# Patient Record
Sex: MALE | Race: BLACK OR AFRICAN AMERICAN | Employment: UNEMPLOYED | URBAN - METROPOLITAN AREA
[De-identification: names, ages, dates, MRNs, and addresses within clinical notes are randomized per-mention and may not be internally consistent; named-entity substitution may affect disease eponyms.]

---

## 2020-10-22 ENCOUNTER — HOSPITAL ENCOUNTER (OUTPATIENT)
Dept: GENERAL RADIOLOGY | Age: 35
Discharge: HOME | End: 2020-10-22
Attending: INTERNAL MEDICINE
Payer: COMMERCIAL

## 2020-10-22 DIAGNOSIS — M25.579 ANKLE PAIN: ICD-10-CM

## 2020-10-22 DIAGNOSIS — M79.673 FOOT PAIN: ICD-10-CM

## 2020-10-22 PROCEDURE — 73610 X-RAY EXAM OF ANKLE: CPT

## 2020-10-22 PROCEDURE — 73630 X-RAY EXAM OF FOOT: CPT

## 2021-12-21 ENCOUNTER — HOSPITAL ENCOUNTER (OUTPATIENT)
Dept: SLEEP MEDICINE | Age: 36
Discharge: HOME | End: 2021-12-21
Payer: COMMERCIAL

## 2021-12-21 PROCEDURE — 95806 SLEEP STUDY UNATT&RESP EFFT: CPT

## 2022-07-01 NOTE — PROGRESS NOTES
[address] [facility]. [address], [address] [facility]  [phone]    Patient Name:  Karan Albert  YOB: 1985      Office Visit 7/6/2022    CHIEF COMPLAINT:    Chief Complaint   Patient presents with    Sleep Apnea       HISTORY OF PRESENT ILLNESS:      This is a very pleasant 39-year-old gentleman seen in outpatient consultation at the request of Dr. Abraham Cazares for evaluation management of sleep apnea. The patient has a longstanding history of morbid obesity. At his peak weight, he weighed over 500 pounds. He was able to lose about 200 pounds but unfortunately has gained back about 40 to 50 pounds in the last few months. During this time he has also developed more edema and has had worsening hypertension. Because of the symptoms he was ordered a home sleep test which reveals evidence of moderate obstructive sleep apnea with an AHI of 20.0. He desaturated to low of 81 and spent about 7.5 minutes with saturations less than 89%. On close questioning, the patient reports that he slept poorly the night of his home sleep test.  The patient indicates that while he was recorded for nearly 5.5 hours of sleep he only thinks he slept about 3 hours at best.  This would suggest that his true AHI is over 30 and the severe category. With such limited duration of sleep, it is also possible that he did not go into REM sleep at all which would also likely account for a lower AHI and higher oxygen saturation gema. I reviewed the results of the home sleep test with the patient and indicated that CPAP is the gold standard treatment for his sleep apnea and will be recommended at this time. He is agreeable to it. I will try to treat him with an APAP at 6-20 cmH2O with an EPR of 2 to start and see how he tolerates it. I will also start him with a nasal mask since these tend to work better long-term.   He is intending to lose a significant amount of weight and full facemask tend to be Sexual Activity    Alcohol use: Not on file    Drug use: Not on file    Sexual activity: Not on file   Other Topics Concern    Not on file   Social History Narrative    Not on file     Social Determinants of Health     Financial Resource Strain:     Difficulty of Paying Living Expenses: Not on file   Food Insecurity:     Worried About Running Out of Food in the Last Year: Not on file    Toni of Food in the Last Year: Not on file   Transportation Needs:     Lack of Transportation (Medical): Not on file    Lack of Transportation (Non-Medical): Not on file   Physical Activity:     Days of Exercise per Week: Not on file    Minutes of Exercise per Session: Not on file   Stress:     Feeling of Stress : Not on file   Social Connections:     Frequency of Communication with Friends and Family: Not on file    Frequency of Social Gatherings with Friends and Family: Not on file    Attends Quaker Services: Not on file    Active Member of [employer] or Organizations: Not on file    Attends Club or Organization Meetings: Not on file    Marital Status: Not on file   Intimate Partner Violence:     Fear of Current or Ex-Partner: Not on file    Emotionally Abused: Not on file    Physically Abused: Not on file    Sexually Abused: Not on file   Housing Stability:     Unable to Pay for Housing in the Last Year: Not on file    Number of [place] in the Last Year: Not on file    Unstable Housing in the Last Year: Not on file         No family history on file.       No Known Allergies      Current Outpatient Medications   Medication Sig    amLODIPine (NORVASC) 5 MG tablet TAKE 1 TABLET BY MOUTH EVERY DAY    furosemide (LASIX) 40 MG tablet TAKE 1 TABLET BY MOUTH EVERY DAY    potassium chloride (KLOR-CON) 10 MEQ extended release tablet TAKE 1 TABLET BY MOUTH EVERY DAY WITH FOOD    testosterone cypionate (DEPOTESTOTERONE CYPIONATE) 200 MG/ML injection INJECT 1 MILLILITER (200 MG) BY INTRAMUSCULAR ROUTE EVERY 2 WEEKS     No current facility-administered medications for this visit. REVIEW OF SYSTEMS:   CONSTITUTIONAL:   There is no history of fever, chills, night sweats, weight loss, weight gain, persistent fatigue, or lethargy/hypersomnolence. EYES:   Denies problems with eye pain, erythema, blurred vision, or visual field loss. ENTM:   Denies history of tinnitus, epistaxis, sore throat, hoarseness, or dysphonia. LYMPH:   Denies swollen glands. CARDIAC:   No chest pain, pressure, discomfort, palpitations, orthopnea, murmurs, or edema. GI:   No dysphagia, heartburn reflux, nausea/vomiting, diarrhea, abdominal pain, or bleeding. :   Denies history of dysuria, hematuria, polyuria, or decreased urine output. MS:   No history of myalgias, arthralgias, bone pain, or muscle cramps. SKIN:   No history of rashes, jaundice, cyanosis, nodules, or ulcers. ENDO:   Negative for heat or cold intolerance. No history of DM. PSYCH:   Negative for anxiety, depression, insomnia, hallucinations. NEURO:   There is no history of AMS, persistent headache, decreased level of consciousness, seizures, or motor or sensory deficits. PHYSICAL EXAM:    Vitals:    07/06/22 1521   BP: (!) 146/94   Pulse: 96   Resp: 15   Temp: 96.8 °F (36 °C)   SpO2: 94%        GENERAL APPEARANCE:   The patient is overweight and in no respiratory distress. He is extremely muscular. HEENT:   PERRL. Conjunctivae unremarkable. Nasal mucosa is without epistaxis, exudate, or polyps. Gums and dentition are unremarkable. There is moderately severe oropharyngeal narrowing. NECK/LYMPHATIC:   Symmetrical with no elevation of jugular venous pulsation. Trachea midline. No thyroid enlargement. No cervical adenopathy. LUNGS:   Normal respiratory effort with symmetrical lung expansion. Breath sounds are mildly decreased bilaterally. Kelly Karst HEART:   There is a regular rate and rhythm. No murmur, rub, or gallop.   There is no edema in the lower extremities. ABDOMEN:   Soft and non-tender. Bowel sounds are normal.     SKIN:   There are no rashes, cyanosis, jaundice, or ecchymosis present. EXTREMITIES:   The extremities are unremarkable without clubbing, cyanosis, joint inflammation, degenerative, or ischemic change. MUSCULOSKELETAL:   There is no abnormal tone, muscle atrophy, or abnormal movement present. NEURO:   The patient is alert and oriented to person, place, and time. Memory appears intact and mood is normal.  No gross sensorimotor deficits are present. ASSESSMENT:  (Medical Decision Making)      ICD-10-CM    1. JOHNNIE (obstructive sleep apnea)  G47.33  The patient probably has severe obstructive sleep apnea based upon his relatively poor sleep efficiency for the home sleep test.  He would be best served with CPAP therapy. I will treat him with APAP at 6-20 cmH2O with an EPR of 2 to start via nasal mask. He is encouraged to use nasal saline 2 sprays per nostril 4 times daily to help prevent nasal edema which can occur with a nasal mask. He is also counseled that should he find that his mouth is dry upon awakening on CPAP, he would need to apply the order chinstrap to help keep his mouth closed. 2. Morbid obesity with BMI of 45.0-49.9, [facility]  E66.01 While the patient's BMI is 46, he is so muscular that much of the BMI is related to muscle mass and not body fat. Hopefully by getting his sleep apnea corrected he can rapidly lose the weight he gained over the past couple of months and improve even further. The patient is provided our Wheat Belly diet handout for review. He is encouraged to look at cookbooks associated with the diet. Z68.42    3. Hypertension, unspecified type  I10 The patient's blood pressure today is elevated likely related to his sleep apnea and obesity. Controlling his sleep apnea should certainly help improve his blood pressure over time.            PLAN:    Begin APAP at 6-20 cmH2O with an Mask (1 per month) Interface/Cushion  (  x   ) -Pillow (2 per mon)  (  x   ) -Nefagffnv (1 per 6 mon)      _________________________________________________________________          Other Supplies:    (  X   )-Jkxewhac (1 per 6 mon)  ( X    )-Gpvixq Tubing (1 per 3 mon)  (  X   )- Disposable Filter (2 per mon)  (   X  )-Tjicbt Humidifier (1 per year)     (  x   )-Xtleewomq (sometimes used with Full Face Mask) (1 per 6 mos)  (     )-Tubing-without heat (1 per 3 mos)  ( X   )-Non-Disposable Filter (1 per 6 mos)  (   x  )-Water Chamber (1 per 6 mos)  (     )-Humidifier non-heated (1 per 5 yrs)      Signed Date: 7/6/2022  Electronically Signed By: MD Maricruz Hyde MD  Electronically signed    Over 50% of today's office visit was spent in face to face time reviewing test results, prognosis, importance of compliance, education about disease process, benefits of medications, instructions for management of acute flare-ups, and follow up plans. Total face to face time spent with the patient and charting was 42 minutes. Dictated using voice recognition software.   Proof read but unrecognized errors may exist.

## 2022-07-06 ENCOUNTER — OFFICE VISIT (OUTPATIENT)
Dept: SLEEP MEDICINE | Age: 37
End: 2022-07-06
Payer: COMMERCIAL

## 2022-07-06 VITALS
HEART RATE: 96 BPM | WEIGHT: 315 LBS | HEIGHT: 74 IN | RESPIRATION RATE: 15 BRPM | TEMPERATURE: 96.8 F | DIASTOLIC BLOOD PRESSURE: 94 MMHG | BODY MASS INDEX: 40.43 KG/M2 | SYSTOLIC BLOOD PRESSURE: 146 MMHG | OXYGEN SATURATION: 94 %

## 2022-07-06 DIAGNOSIS — E66.01 MORBID OBESITY WITH BMI OF 45.0-49.9, ADULT (HCC): ICD-10-CM

## 2022-07-06 DIAGNOSIS — G47.33 OSA (OBSTRUCTIVE SLEEP APNEA): ICD-10-CM

## 2022-07-06 DIAGNOSIS — I10 HYPERTENSION, UNSPECIFIED TYPE: ICD-10-CM

## 2022-07-06 PROCEDURE — 99204 OFFICE O/P NEW MOD 45 MIN: CPT | Performed by: INTERNAL MEDICINE

## 2022-07-06 RX ORDER — AMLODIPINE BESYLATE 5 MG/1
TABLET ORAL
COMMUNITY
Start: 2022-04-22

## 2022-07-06 RX ORDER — POTASSIUM CHLORIDE 750 MG/1
TABLET, FILM COATED, EXTENDED RELEASE ORAL
COMMUNITY
Start: 2022-06-16

## 2022-07-06 RX ORDER — FUROSEMIDE 40 MG/1
TABLET ORAL
COMMUNITY
Start: 2022-06-16

## 2022-07-06 RX ORDER — TESTOSTERONE CYPIONATE 200 MG/ML
INJECTION INTRAMUSCULAR
COMMUNITY
Start: 2022-06-10

## 2022-07-06 ASSESSMENT — SLEEP AND FATIGUE QUESTIONNAIRES
HOW LIKELY ARE YOU TO NOD OFF OR FALL ASLEEP WHILE LYING DOWN TO REST IN THE AFTERNOON WHEN CIRCUMSTANCES PERMIT: 0
HOW LIKELY ARE YOU TO NOD OFF OR FALL ASLEEP WHILE SITTING QUIETLY AFTER LUNCH WITHOUT ALCOHOL: 0
HOW LIKELY ARE YOU TO NOD OFF OR FALL ASLEEP WHILE WATCHING TV: 0
HOW LIKELY ARE YOU TO NOD OFF OR FALL ASLEEP IN A CAR, WHILE STOPPED FOR A FEW MINUTES IN TRAFFIC: 0
HOW LIKELY ARE YOU TO NOD OFF OR FALL ASLEEP WHILE SITTING AND READING: 0
HOW LIKELY ARE YOU TO NOD OFF OR FALL ASLEEP WHEN YOU ARE A PASSENGER IN A CAR FOR AN HOUR WITHOUT A BREAK: 0
HOW LIKELY ARE YOU TO NOD OFF OR FALL ASLEEP WHILE SITTING AND TALKING TO SOMEONE: 0
ESS TOTAL SCORE: 0
HOW LIKELY ARE YOU TO NOD OFF OR FALL ASLEEP WHILE SITTING INACTIVE IN A PUBLIC PLACE: 0

## 2022-07-06 NOTE — PATIENT INSTRUCTIONS
especially those that are non-starchy and low in calories. These include things like cruciferous veggies (broccoli or [place] sprouts, for example), leafy greens, peppers, mushrooms, asparagus, artichoke, etc.   Fresh fruit (but not processed juices), including berries, apples, melon, and citrus fruits like grapefruit or oranges. Some people prefer to eat mostly low-sugar fruits but avoid those higher in sugar like pineapple, papaya, donald or banana. Healthy fats like coconut oil or olive oil, raw nuts and seeds, avocado, coconut milk, olives, cocoa butter, and grass-fed butter or ghee. Grass-fed, humanely raised meat and eggs, plus wild-caught fish. Full-fat cheeses (ideally made from raw, organic milk). Fermented foods like unsweetened kefir or yogurt, pickled or cultured vegetables, and in moderation tofu, tempeh, miso and natto. If theyre well-tolerated, unprocessed grains in moderation, including quinoa, millet, buckwheat (not actually a type of wheat), brown rice and amaranth. Worst Wheat Belly Foods to Avoid:    Eating a wheat belly diet means avoiding anything made with the grains wheat, barley, rye, spelt or certain oats. Additionally, Karen Piek recommends avoiding added sugar, condiments that include synthetic or chemically altered ingredients, sugary drinks and other processed foods as much as possible. Below are the main foods to exclude from your diet if you choose to try following this dietary plan:  Grain-based desserts, including both packaged or homemade cakes, cookies, donuts, pies, crisps, cobblers, and granola bars   Breads, especially those made with refined wheat flour. Even many gluten-free breads or packaged products should not contribute many calories to your diet.  While products made from grains other than wheat (like corn or rice) might be free of gluten, theyre still usually not very nutrient-dense and are inferior to eating whole, sprouted ancient grains like oats, quinoa, wild rice or teff, for example. Plus, modern food-processing techniques usually contaminate these foods with gluten since theyre processed using the same equipment that wheat is. Most cereals   Pizza   Pasta and noodles   Chips and crackers   Wheat tortillas, wraps, burritos and tacos   Fast food   Take-out, including most [place] or [place] dishes, burgers and deli sandwiches   Breaded proteins like chicken cutlets, processed meats, hot dogs and frozen veggie burgers   Added sugar, including high fructose corn syrup, sucrose, dried fruit, juices and sugary beverage   Processed rice and potato products   Trans fats, fried foods and cured meats  Tips for Giving Up Wheat and Processed Grains:  When grocery shopping, check ingredients carefully and look for products made without wheat, rye and barley. This might mean choosing certified gluten-free items in some cases, although even these can be highly processed. The most substantial sources of wheat in your diet are likely bread or baked products made with wheat flour (like pizza, pasta at restaurants, bread, etc.), so unless specifically noted that these are grain- or gluten-free, assume they contain wheat. If you are going to buy bread, look for sourdough or sprouted grain breads (like Andrew bread), which are usually better tolerated than ordinary wheat-flour breads. When it comes to baking or using flour in recipes, try some of these naturally gluten-free flour alternatives over wheat flour: brown rice, quinoa, chickpea, almond and coconut flour. Remember that wheat is hiding in many condiments, sauces, dressings, etc. Avoid any that contain flour or added sugar, sticking with basic condiments or flavor enhancers like vinegar, herbs, spices and real bone broth. Many types of alcohol, including beer, also contain wheat.  Hard liquor and wine are better options, however watch the amount you consume and what you mix these with.

## 2022-07-06 NOTE — LETTER
[address]  [address] [address]  Phone: [phone]  Fax: [phone]    Jeniffer Smart MD    July 6, 2022     Gunnar Ryan MD  [address] [facility],Third Floor [address]    Patient: Andriy Bar   MR Number: [id number]   YOB: 1985   Date of Visit: 7/6/2022       Dear Gunnar Ryan:    Thank you for referring Andriy Bar to me for evaluation/treatment. Below are the relevant portions of my assessment and plan of care. If you have questions, please do not hesitate to call me. I look forward to following [facility] along with you.     Sincerely,      Jeniffer Smart MD